# Patient Record
Sex: FEMALE | Race: WHITE | NOT HISPANIC OR LATINO | Employment: OTHER | URBAN - METROPOLITAN AREA
[De-identification: names, ages, dates, MRNs, and addresses within clinical notes are randomized per-mention and may not be internally consistent; named-entity substitution may affect disease eponyms.]

---

## 2024-11-01 ENCOUNTER — LAB VISIT (OUTPATIENT)
Dept: LAB | Facility: HOSPITAL | Age: 59
End: 2024-11-01
Attending: STUDENT IN AN ORGANIZED HEALTH CARE EDUCATION/TRAINING PROGRAM
Payer: COMMERCIAL

## 2024-11-01 ENCOUNTER — OFFICE VISIT (OUTPATIENT)
Dept: PRIMARY CARE CLINIC | Facility: CLINIC | Age: 59
End: 2024-11-01
Payer: COMMERCIAL

## 2024-11-01 VITALS
WEIGHT: 192.69 LBS | HEART RATE: 75 BPM | OXYGEN SATURATION: 98 % | SYSTOLIC BLOOD PRESSURE: 112 MMHG | DIASTOLIC BLOOD PRESSURE: 74 MMHG | BODY MASS INDEX: 26.1 KG/M2 | HEIGHT: 72 IN

## 2024-11-01 DIAGNOSIS — Z12.31 ENCOUNTER FOR SCREENING MAMMOGRAM FOR MALIGNANT NEOPLASM OF BREAST: ICD-10-CM

## 2024-11-01 DIAGNOSIS — M05.79 RHEUMATOID ARTHRITIS INVOLVING MULTIPLE SITES WITH POSITIVE RHEUMATOID FACTOR: ICD-10-CM

## 2024-11-01 DIAGNOSIS — Z96.653 HISTORY OF BILATERAL KNEE ARTHROPLASTY: ICD-10-CM

## 2024-11-01 DIAGNOSIS — Z00.00 HEALTH CARE MAINTENANCE: ICD-10-CM

## 2024-11-01 DIAGNOSIS — M50.90 CERVICAL DISC DISEASE: ICD-10-CM

## 2024-11-01 DIAGNOSIS — M05.79 RHEUMATOID ARTHRITIS INVOLVING MULTIPLE SITES WITH POSITIVE RHEUMATOID FACTOR: Primary | ICD-10-CM

## 2024-11-01 DIAGNOSIS — Z79.899 HIGH RISK MEDICATION USE: ICD-10-CM

## 2024-11-01 PROBLEM — R59.1 LAD (LYMPHADENOPATHY): Status: ACTIVE | Noted: 2024-11-01

## 2024-11-01 PROCEDURE — 84443 ASSAY THYROID STIM HORMONE: CPT | Performed by: STUDENT IN AN ORGANIZED HEALTH CARE EDUCATION/TRAINING PROGRAM

## 2024-11-01 PROCEDURE — 87389 HIV-1 AG W/HIV-1&-2 AB AG IA: CPT | Performed by: STUDENT IN AN ORGANIZED HEALTH CARE EDUCATION/TRAINING PROGRAM

## 2024-11-01 PROCEDURE — 85025 COMPLETE CBC W/AUTO DIFF WBC: CPT | Performed by: STUDENT IN AN ORGANIZED HEALTH CARE EDUCATION/TRAINING PROGRAM

## 2024-11-01 PROCEDURE — 80048 BASIC METABOLIC PNL TOTAL CA: CPT | Performed by: STUDENT IN AN ORGANIZED HEALTH CARE EDUCATION/TRAINING PROGRAM

## 2024-11-01 PROCEDURE — 83036 HEMOGLOBIN GLYCOSYLATED A1C: CPT | Performed by: STUDENT IN AN ORGANIZED HEALTH CARE EDUCATION/TRAINING PROGRAM

## 2024-11-01 PROCEDURE — 82607 VITAMIN B-12: CPT | Performed by: STUDENT IN AN ORGANIZED HEALTH CARE EDUCATION/TRAINING PROGRAM

## 2024-11-01 PROCEDURE — 80061 LIPID PANEL: CPT | Performed by: STUDENT IN AN ORGANIZED HEALTH CARE EDUCATION/TRAINING PROGRAM

## 2024-11-01 PROCEDURE — 99999 PR PBB SHADOW E&M-EST. PATIENT-LVL V: CPT | Mod: PBBFAC,,, | Performed by: STUDENT IN AN ORGANIZED HEALTH CARE EDUCATION/TRAINING PROGRAM

## 2024-11-01 RX ORDER — METHYLPREDNISOLONE 4 MG/1
TABLET ORAL
Qty: 1 EACH | Refills: 0 | Status: SHIPPED | OUTPATIENT
Start: 2024-11-01

## 2024-11-01 NOTE — PROGRESS NOTES
[facility]  Physical Therapy Initial Evaluation    Date: 11/4/2024   Name: Kaylah Tripathi [facility]  Clinic Number: [id number]    Therapy Diagnosis:   Encounter Diagnoses   Name Primary?    Cervical spondylosis     Cervical disc disease Yes     Physician: Valerio Shahid, *    Physician Orders: PT Eval and Treat   Medical Diagnosis from Referral: M50.90 (ICD-10-CM) - Cervical disc disease  Evaluation Date: 11/4/2024  Authorization Period Expiration: 11/1/2025  Plan of Care Expiration: 1/4/2025  Visit # / Visits authorized: 1/ 1 (pending)   Progress Note Due: 12/4/2025  FOTO: 1/ 1     Precautions: Standard and RA, left hearing loss    Time In: 2:35 PM  Time Out: 3:32 PM  Total Appointment Time (timed & untimed codes): 56 minutes    Subjective   Date of onset: ~ 2 months ago  History of current condition - Kaylah reports: that she had a double knee replacement of this year. Shortly following this she had a significant onset of neck pain that came around the same time that she had a lot of stress from difficulty with her multiple [employer]. Patient says she was diagnosed with RA 3 years ago. Shortly after neck pain onset she returned to see her orthopedist, Dr. Johnson. He told her not to use salon head washes, ride roller coasters or see chiropractors. She proceeded to see her PCP who took some x-rays and prescribed her physical therapy. She was on Emberil and recently went off, but is planning to return to it with doctors guidance for her RA. She was also placed on Prednisone which has recently helped alleviate her symptoms. Aggravating factors include rotating her head bilateral, turning head when driving, looking up, sitting/standing upright for long periods and performing work activities.     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     Facet hypertrophic degenerative changes are seen at the lower cervical spine bilaterally.     No fracture. No subluxation.        Medical History:   Past Medical History:   Diagnosis Date    Hearing loss of left ear 7/3/2024    Neuropathy     Rheumatoid arthritis      Surgical History:   Kaylah Shi  has a past surgical history that includes Induced  and Arthroplasty of both knees (Bilateral, 2024).    Medications:   Kyalah has a current medication list which includes the following prescription(s): ascorbic acid (vitamin c), aspirin, calcium-vitamin d3, ergocalciferol, fluticasone propionate, folic acid, hydroxychloroquine, ibuprofen, methylprednisolone, multivitamin, and UNABLE TO FIND.    Allergies:   Review of patient's allergies indicates:  No Known Allergies       Objective   Observation: Decreased left weight shift, protracted scapulae (right > left), decreased lumbar lordosis, left shoulder depression    Posture Alignment: slouched posture;forward head; trunk deviated right    Sensation: Light touch: Intact    DTR:WFL    Range of Motion - MOVEMENT LOSS "    ROM Loss   Flexion moderate loss   Extension moderate loss   Side bending Right major loss   Side bending Left major loss   Rotation Right major loss   Rotation Left major loss   Protraction within functional limits   Retraction  major loss       REPEATED TEST MOVEMENTS:   Repeated Protraction in Sitting worse   Repeated Flexion in Sitting no effect   Repeated Retraction in Sitting  no effect   Repeated Retraction Extension in Sitting no effect   Repeated Protraction in lying no effect   Repeated Flexion in lying no effect   Repeated Retraction in lying end range pain   Repeated Retraction Extension in lying abolished     Shoulder Range of Motion:   Shoulder Left Right   Flexion 180 180   Abduction 180 180   ER 45 43   IR 45 45       Upper Extremity Strength  (R) UE  (L) UE    Shoulder elevation: 4/5 Shoulder elevation: 4/5   Shoulder flexion: 4-/5 Shoulder flexion: 4-/5   Shoulder Abduction: 4-/5 Shoulder abduction: 4-/5   Shoulder ER 4-/5 Shoulder ER 4-/5   Shoulder IR 3+/5 Shoulder IR 3+/5   Elbow flexion: 4/5 Elbow flexion: 4/5   Elbow extension: 4/5 Elbow extension: 4/5   Wrist flexion: 4/5 Wrist flexion: 4/5   Wrist extension: 4/5 Wrist extension: 4/5    4+/5 : 4/5       Special Tests:  Distraction -   Compression -   Spurlings -   Sharp-Chema -   Lateral Flexion Alar Ligament -   DNF test -     Upper Limb Neurodynamic testing:   Right Left   UNT - -   MNT - -   RNT - -       Joint Mobility:   PA's hypomobile C6-T4,    Transverse glides T3-T6      Thoracic mobility: Decreased bilateral thoracic rotation and extension at T2-T6    Palpation: TTDP suboccipitals      PT Evaluation Completed? Yes  Discussed Plan of Care with patient: Yes    CMS Impairment/Limitation/Restriction for FOTO Cervical Survey    Therapist reviewed FOTO scores for Kaylah Shi on 11/4/2024.   FOTO documents entered into "[employer] - see Media section.    Limitation Score: 63%  Goal Score: 72%         TREATMENT  "    Total Treatment time separate from Evaluation: 23 minutes    Kaylah received therapeutic exercises to develop strength, endurance, ROM, flexibility, posture, and core stabilization for 0 minutes including:    Kaylah received the following manual therapy techniques: Joint mobilizations, Manual traction, Myofacial release, Manual Lymphatic Drainage, Soft tissue Mobilization, and Friction Massage were applied to the: - for 0 minutes, including:    Kaylah participated in neuromuscular re-education activities to improve: Balance, Coordination, Kinesthetic, Sense, Proprioception, and Posture for 23 minutes. The following activities were included:    Seated chin tucks x 5" x 3 x 10  Open books 3 x 10  Anterior scalene stretch x 1' x 3  RTB pull aparts w/chin tuck 3 x 10    Workstation ergonomics printout    Kaylah participated in dynamic functional therapeutic activities to improve functional performance for 0  minutes, including:    Kaylah participated in gait training to improve functional mobility and safety for 0 minutes, including:    Kaylah received  hot or cold pack for 0 minutes to cervical spine.      Home Exercises and Patient Education Provided    Education provided:   - HEP/Prognosis/POC/Relevant anatomy    Written Home Exercises Provided: Patient instructed to cont prior HEP.  Exercises were reviewed and Kaylah was able to demonstrate them prior to the end of the session.  Kaylah demonstrated good  understanding of the education provided.     See EMR under Patient Instructions for exercises provided 11/4/2024.    Assessment   Kaylah is a 59 y.o. female referred to outpatient Physical Therapy with a medical diagnosis of M50.90 (ICD-10-CM) - Cervical disc disease. Pt presents with bilateral neck pain that began 2 months ago when she had a significant increase in stressful work circumstances. Patient reports suffering from rheumatoid arthritis that is managed through diet modification and medical management. " Upon onset of neck pain she proceeded to see a doctor who provided her with a course of Prednisone which has offered substantial symptom relief and referred to physical therapy due to x-ray revealing cervical osteoarthritic changes. Physical examination reveals grossly limited cervical AROM except with protraction; decreased bilateral shoulder internal rotation MMT strength; neck pain provocation with protraction in sitting and repeated retraction in lying at end range; pain reduction with repeated cervical retraction with extension in lying; hypomobile posterior to anterior joint mobility at C6-T4, decreased transverse glides T3-T6, impaired thoracic extension; and tenderness to palpation of bilateral suboccipitals. Signs and symptoms are most consistent with cervical osteoarthritis. Patient's impairments affect her ability to perform ADLs that involve cervical extension or bilateral rotation; turning her head when driving; and maintaining upright postures for long durations throughout her days. Given the severity of the patient's cervical spine, her past medical history, and overall health for her age, a full recovery is expected within 6-8 weeks. Her rehab potential is dependent on compliance with PT recommendations and adherence to her home exercise program. Skilled PT intervention is required to address these key impairments and to provide and progress with an appropriate home exercise program. This evaluation is of low complexity due to the stable nature of the patients presentation as well as the comorbidities and medical factors included in this evaluation.The patient has been educated in the evaluation findings, prognosis, and plan of care, HEP and is in agreement and willing to participate in therapy.    Pt prognosis is Excellent.   Pt will benefit from skilled outpatient Physical Therapy to address the deficits stated above and in the chart below, provide pt/family education, and to maximize pt's level  of independence.     Plan of care discussed with patient: Yes  Pt's spiritual, cultural and educational needs considered and patient is agreeable to the plan of care and goals as stated below:     Anticipated Barriers for therapy: RA co-mobidity    Medical Necessity is demonstrated by the following  History  Co-morbidities and personal factors that may impact the plan of care Co-morbidities:    Hearing loss of left ear    Neuropath    Rheumatoid arthritis       Personal Factors:   no deficits     low   Examination  Body Structures and Functions, activity limitations and participation restrictions that may impact the plan of care Body Regions:   neck    Body Systems:    gross symmetry  ROM  strength  transitions    Participation Restrictions:   none    Activity limitations:   Learning and applying knowledge  no deficits    General Tasks and Commands  no deficits    Communication  no deficits    Mobility  driving (bike, car, motorcycle)    Self care  no deficits    Domestic Life  no deficits    Interactions/Relationships  no deficits    Life Areas  no deficits    Community and Social Life  no deficits         Complexity: low   Clinical Presentation stable and uncomplicated low   Decision Making/ Complexity Score: low       GOALS: Short Term Goals: 4 weeks  1.Report decreased in pain at worse less than  <   / =  4  /10  to increase tolerance for functional activities. On going  2. Pt to improve range of motion cervical by 25% to allow for improved functional mobility to allow for improvement in IADLs.  On going  3. Increased bilateral shoulder internal rotation MMT 1/2  grade to increase tolerance for ADL and work activities. On going  4. Pt will be able to turn her head while driving with no greater than 2/10 neck pain. On going  5. Pt to tolerate HEP to improve ROM and independence with ADL's. On going    Long Term Goals: 8 weeks  1.Report decreased in pain at worse less than  <   / =  1  /10  to increase tolerance  for functional activities. On going  2.Pt to improve range of motion by 75% to allow for improved functional mobility to allow for improvement in IADLs. On going  3.Increased bilateral shoulder internal rotation MMT  1 grade  to increase tolerance for ADL and work activities.On going  4.  Pt will report at least a 72% on FOTO neck survey score for neck pain disability to demonstrate decrease in disability and improvement in neck pain.On going  5. Pt to be Independent with HEP to improve ROM and independence with ADL's. On going  6. Pt to demonstrate ability to independently control and reduce their pain through posture positioning and mechanical movements throughout a typical day.  (approp and ongoing)  7. Pt will achieve adequate cervicothoracic stability in order to tolerate working at a desk for at least 1 hour with no greater than 1/10 neck pain in order to return to work activities.      Plan   Plan of care Certification: 11/4/2024 to 1/4/2025.    Outpatient Physical Therapy 1-2 times weekly for 6-8 weeks to include the following interventions: Cervical/Lumbar Traction, Electrical Stimulation  , Gait Training, Manual Therapy, Moist Heat/ Ice, Neuromuscular Re-ed, Patient Education, Self Care, Therapeutic Activities, Therapeutic Exercise, and Ultrasound. Guadalupe Pavon, PT      I CERTIFY THE NEED FOR THESE SERVICES FURNISHED UNDER THIS PLAN OF TREATMENT AND WHILE UNDER MY CARE   Physician's comments:     Physician's Signature: ___________________________________________________

## 2024-11-02 LAB
ANION GAP SERPL CALC-SCNC: 10 MMOL/L (ref 8–16)
BASOPHILS # BLD AUTO: 0.05 K/UL (ref 0–0.2)
BASOPHILS NFR BLD: 0.8 % (ref 0–1.9)
BUN SERPL-MCNC: 8 MG/DL (ref 6–20)
CALCIUM SERPL-MCNC: 9.5 MG/DL (ref 8.7–10.5)
CHLORIDE SERPL-SCNC: 101 MMOL/L (ref 95–110)
CHOLEST SERPL-MCNC: 153 MG/DL (ref 120–199)
CHOLEST/HDLC SERPL: 3.1 {RATIO} (ref 2–5)
CO2 SERPL-SCNC: 25 MMOL/L (ref 23–29)
CREAT SERPL-MCNC: 0.7 MG/DL (ref 0.5–1.4)
DIFFERENTIAL METHOD BLD: ABNORMAL
EOSINOPHIL # BLD AUTO: 0.2 K/UL (ref 0–0.5)
EOSINOPHIL NFR BLD: 2.5 % (ref 0–8)
ERYTHROCYTE [DISTWIDTH] IN BLOOD BY AUTOMATED COUNT: 12.8 % (ref 11.5–14.5)
EST. GFR  (NO RACE VARIABLE): >60 ML/MIN/1.73 M^2
ESTIMATED AVG GLUCOSE: 103 MG/DL (ref 68–131)
GLUCOSE SERPL-MCNC: 73 MG/DL (ref 70–110)
HBA1C MFR BLD: 5.2 % (ref 4–5.6)
HCT VFR BLD AUTO: 37.8 % (ref 37–48.5)
HDLC SERPL-MCNC: 50 MG/DL (ref 40–75)
HDLC SERPL: 32.7 % (ref 20–50)
HGB BLD-MCNC: 11.5 G/DL (ref 12–16)
HIV 1+2 AB+HIV1 P24 AG SERPL QL IA: NORMAL
IMM GRANULOCYTES # BLD AUTO: 0.02 K/UL (ref 0–0.04)
IMM GRANULOCYTES NFR BLD AUTO: 0.3 % (ref 0–0.5)
LDLC SERPL CALC-MCNC: 87.2 MG/DL (ref 63–159)
LYMPHOCYTES # BLD AUTO: 1.4 K/UL (ref 1–4.8)
LYMPHOCYTES NFR BLD: 23.6 % (ref 18–48)
MCH RBC QN AUTO: 28.2 PG (ref 27–31)
MCHC RBC AUTO-ENTMCNC: 30.4 G/DL (ref 32–36)
MCV RBC AUTO: 93 FL (ref 82–98)
MONOCYTES # BLD AUTO: 0.7 K/UL (ref 0.3–1)
MONOCYTES NFR BLD: 11 % (ref 4–15)
NEUTROPHILS # BLD AUTO: 3.6 K/UL (ref 1.8–7.7)
NEUTROPHILS NFR BLD: 61.8 % (ref 38–73)
NONHDLC SERPL-MCNC: 103 MG/DL
NRBC BLD-RTO: 0 /100 WBC
PLATELET # BLD AUTO: 440 K/UL (ref 150–450)
PMV BLD AUTO: 8.7 FL (ref 9.2–12.9)
POTASSIUM SERPL-SCNC: 3.8 MMOL/L (ref 3.5–5.1)
RBC # BLD AUTO: 4.08 M/UL (ref 4–5.4)
SODIUM SERPL-SCNC: 136 MMOL/L (ref 136–145)
TRIGL SERPL-MCNC: 79 MG/DL (ref 30–150)
TSH SERPL DL<=0.005 MIU/L-ACNC: 2.29 UIU/ML (ref 0.4–4)
VIT B12 SERPL-MCNC: 779 PG/ML (ref 210–950)
WBC # BLD AUTO: 5.89 K/UL (ref 3.9–12.7)

## 2024-11-04 ENCOUNTER — CLINICAL SUPPORT (OUTPATIENT)
Dept: REHABILITATION | Facility: OTHER | Age: 59
End: 2024-11-04
Payer: COMMERCIAL

## 2024-11-04 DIAGNOSIS — M50.90 CERVICAL DISC DISEASE: Primary | ICD-10-CM

## 2024-11-04 DIAGNOSIS — M47.812 CERVICAL SPONDYLOSIS: ICD-10-CM

## 2024-11-04 PROCEDURE — 97112 NEUROMUSCULAR REEDUCATION: CPT

## 2024-11-04 PROCEDURE — 97161 PT EVAL LOW COMPLEX 20 MIN: CPT

## 2024-11-04 NOTE — PLAN OF CARE
[facility]  Physical Therapy Initial Evaluation    Date: 11/4/2024   Name: Kaylah Tripathi [facility]  Clinic Number: [id number]    Therapy Diagnosis:   Encounter Diagnoses   Name Primary?    Cervical spondylosis     Cervical disc disease Yes     Physician: Valerio Shahid, *    Physician Orders: PT Eval and Treat   Medical Diagnosis from Referral: M50.90 (ICD-10-CM) - Cervical disc disease  Evaluation Date: 11/4/2024  Authorization Period Expiration: 11/1/2025  Plan of Care Expiration: 1/4/2025  Visit # / Visits authorized: 1/ 1 (pending)   Progress Note Due: 12/4/2025  FOTO: 1/ 1     Precautions: Standard and RA, left hearing loss    Time In: 2:35 PM  Time Out: 3:32 PM  Total Appointment Time (timed & untimed codes): 56 minutes    Subjective   Date of onset: ~ 2 months ago  History of current condition - Kaylah reports: that she had a double knee replacement of this year. Shortly following this she had a significant onset of neck pain that came around the same time that she had a lot of stress from difficulty with her multiple [employer]. Patient says she was diagnosed with RA 3 years ago. Shortly after neck pain onset she returned to see her orthopedist, Dr. Johnson. He told her not to use salon head washes, ride roller coasters or see chiropractors. She proceeded to see her PCP who took some x-rays and prescribed her physical therapy. She was on Emberil and recently went off, but is planning to return to it with doctors guidance for her RA. She was also placed on Prednisone which has recently helped alleviate her symptoms. Aggravating factors include rotating her head bilateral, turning head when driving, looking up, sitting/standing upright for long periods and performing work activities.     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     Facet hypertrophic degenerative changes are seen at the lower cervical spine bilaterally.     No fracture. No subluxation.        Medical History:   Past Medical History:   Diagnosis Date    Hearing loss of left ear 7/3/2024    Neuropathy     Rheumatoid arthritis      Surgical History:   Kaylah Shi  has a past surgical history that includes Induced  and Arthroplasty of both knees (Bilateral, 2024).    Medications:   Kaylah has a current medication list which includes the following prescription(s): ascorbic acid (vitamin c), aspirin, calcium-vitamin d3, ergocalciferol, fluticasone propionate, folic acid, hydroxychloroquine, ibuprofen, methylprednisolone, multivitamin, and UNABLE TO FIND.    Allergies:   Review of patient's allergies indicates:  No Known Allergies       Objective   Observation: Decreased left weight shift, protracted scapulae (right > left), decreased lumbar lordosis, left shoulder depression    Posture Alignment: slouched posture;forward head; trunk deviated right    Sensation: Light touch: Intact    DTR:WFL    Range of Motion - MOVEMENT LOSS "    ROM Loss   Flexion moderate loss   Extension moderate loss   Side bending Right major loss   Side bending Left major loss   Rotation Right major loss   Rotation Left major loss   Protraction within functional limits   Retraction  major loss       REPEATED TEST MOVEMENTS:   Repeated Protraction in Sitting worse   Repeated Flexion in Sitting no effect   Repeated Retraction in Sitting  no effect   Repeated Retraction Extension in Sitting no effect   Repeated Protraction in lying no effect   Repeated Flexion in lying no effect   Repeated Retraction in lying end range pain   Repeated Retraction Extension in lying abolished     Shoulder Range of Motion:   Shoulder Left Right   Flexion 180 180   Abduction 180 180   ER 45 43   IR 45 45       Upper Extremity Strength  (R) UE  (L) UE    Shoulder elevation: 4/5 Shoulder elevation: 4/5   Shoulder flexion: 4-/5 Shoulder flexion: 4-/5   Shoulder Abduction: 4-/5 Shoulder abduction: 4-/5   Shoulder ER 4-/5 Shoulder ER 4-/5   Shoulder IR 3+/5 Shoulder IR 3+/5   Elbow flexion: 4/5 Elbow flexion: 4/5   Elbow extension: 4/5 Elbow extension: 4/5   Wrist flexion: 4/5 Wrist flexion: 4/5   Wrist extension: 4/5 Wrist extension: 4/5    4+/5 : 4/5       Special Tests:  Distraction -   Compression -   Spurlings -   Sharp-Chema -   Lateral Flexion Alar Ligament -   DNF test -     Upper Limb Neurodynamic testing:   Right Left   UNT - -   MNT - -   RNT - -       Joint Mobility:   PA's hypomobile C6-T4,    Transverse glides T3-T6      Thoracic mobility: Decreased bilateral thoracic rotation and extension at T2-T6    Palpation: TTDP suboccipitals      PT Evaluation Completed? Yes  Discussed Plan of Care with patient: Yes    CMS Impairment/Limitation/Restriction for FOTO Cervical Survey    Therapist reviewed FOTO scores for Kaylah Shi on 11/4/2024.   FOTO documents entered into [employer] - see Media section.    Limitation Score: 63%  Goal Score: 72%         TREATMENT  "    Total Treatment time separate from Evaluation: 23 minutes    Kaylah received therapeutic exercises to develop strength, endurance, ROM, flexibility, posture, and core stabilization for 0 minutes including:    Kaylah received the following manual therapy techniques: Joint mobilizations, Manual traction, Myofacial release, Manual Lymphatic Drainage, Soft tissue Mobilization, and Friction Massage were applied to the: - for 0 minutes, including:    Kaylah participated in neuromuscular re-education activities to improve: Balance, Coordination, Kinesthetic, Sense, Proprioception, and Posture for 23 minutes. The following activities were included:    Seated chin tucks x 5" x 3 x 10  Open books 3 x 10  Anterior scalene stretch x 1' x 3  RTB pull aparts w/chin tuck 3 x 10    Workstation ergonomics printout    Kaylah participated in dynamic functional therapeutic activities to improve functional performance for 0  minutes, including:    Kaylah participated in gait training to improve functional mobility and safety for 0 minutes, including:    Kaylah received  hot or cold pack for 0 minutes to cervical spine.      Home Exercises and Patient Education Provided    Education provided:   - HEP/Prognosis/POC/Relevant anatomy    Written Home Exercises Provided: Patient instructed to cont prior HEP.  Exercises were reviewed and Kaylah was able to demonstrate them prior to the end of the session.  Kaylah demonstrated good  understanding of the education provided.     See EMR under Patient Instructions for exercises provided 11/4/2024.    Assessment   Kaylah is a 59 y.o. female referred to outpatient Physical Therapy with a medical diagnosis of M50.90 (ICD-10-CM) - Cervical disc disease. Pt presents with bilateral neck pain that began 2 months ago when she had a significant increase in stressful work circumstances. Patient reports suffering from rheumatoid arthritis that is managed through diet modification and medical management. " Upon onset of neck pain she proceeded to see a doctor who provided her with a course of Prednisone which has offered substantial symptom relief and referred to physical therapy due to x-ray revealing cervical osteoarthritic changes. Physical examination reveals grossly limited cervical AROM except with protraction; decreased bilateral shoulder internal rotation MMT strength; neck pain provocation with protraction in sitting and repeated retraction in lying at end range; pain reduction with repeated cervical retraction with extension in lying; hypomobile posterior to anterior joint mobility at C6-T4, decreased transverse glides T3-T6, impaired thoracic extension; and tenderness to palpation of bilateral suboccipitals. Signs and symptoms are most consistent with cervical osteoarthritis. Patient's impairments affect her ability to perform ADLs that involve cervical extension or bilateral rotation; turning her head when driving; and maintaining upright postures for long durations throughout her days. Given the severity of the patient's cervical spine, her past medical history, and overall health for her age, a full recovery is expected within 6-8 weeks. Her rehab potential is dependent on compliance with PT recommendations and adherence to her home exercise program. Skilled PT intervention is required to address these key impairments and to provide and progress with an appropriate home exercise program. This evaluation is of low complexity due to the stable nature of the patients presentation as well as the comorbidities and medical factors included in this evaluation.The patient has been educated in the evaluation findings, prognosis, and plan of care, HEP and is in agreement and willing to participate in therapy.    Pt prognosis is Excellent.   Pt will benefit from skilled outpatient Physical Therapy to address the deficits stated above and in the chart below, provide pt/family education, and to maximize pt's level  of independence.     Plan of care discussed with patient: Yes  Pt's spiritual, cultural and educational needs considered and patient is agreeable to the plan of care and goals as stated below:     Anticipated Barriers for therapy: RA co-mobidity    Medical Necessity is demonstrated by the following  History  Co-morbidities and personal factors that may impact the plan of care Co-morbidities:    Hearing loss of left ear    Neuropath    Rheumatoid arthritis       Personal Factors:   no deficits     low   Examination  Body Structures and Functions, activity limitations and participation restrictions that may impact the plan of care Body Regions:   neck    Body Systems:    gross symmetry  ROM  strength  transitions    Participation Restrictions:   none    Activity limitations:   Learning and applying knowledge  no deficits    General Tasks and Commands  no deficits    Communication  no deficits    Mobility  driving (bike, car, motorcycle)    Self care  no deficits    Domestic Life  no deficits    Interactions/Relationships  no deficits    Life Areas  no deficits    Community and Social Life  no deficits         Complexity: low   Clinical Presentation stable and uncomplicated low   Decision Making/ Complexity Score: low       GOALS: Short Term Goals: 4 weeks  1.Report decreased in pain at worse less than  <   / =  4  /10  to increase tolerance for functional activities. On going  2. Pt to improve range of motion cervical by 25% to allow for improved functional mobility to allow for improvement in IADLs.  On going  3. Increased bilateral shoulder internal rotation MMT 1/2  grade to increase tolerance for ADL and work activities. On going  4. Pt will be able to turn her head while driving with no greater than 2/10 neck pain. On going  5. Pt to tolerate HEP to improve ROM and independence with ADL's. On going    Long Term Goals: 8 weeks  1.Report decreased in pain at worse less than  <   / =  1  /10  to increase tolerance  for functional activities. On going  2.Pt to improve range of motion by 75% to allow for improved functional mobility to allow for improvement in IADLs. On going  3.Increased bilateral shoulder internal rotation MMT  1 grade  to increase tolerance for ADL and work activities.On going  4.  Pt will report at least a 72% on FOTO neck survey score for neck pain disability to demonstrate decrease in disability and improvement in neck pain.On going  5. Pt to be Independent with HEP to improve ROM and independence with ADL's. On going  6. Pt to demonstrate ability to independently control and reduce their pain through posture positioning and mechanical movements throughout a typical day.  (approp and ongoing)  7. Pt will achieve adequate cervicothoracic stability in order to tolerate working at a desk for at least 1 hour with no greater than 1/10 neck pain in order to return to work activities.      Plan   Plan of care Certification: 11/4/2024 to 1/4/2025.    Outpatient Physical Therapy 1-2 times weekly for 6-8 weeks to include the following interventions: Cervical/Lumbar Traction, Electrical Stimulation , Gait Training, Manual Therapy, Moist Heat/ Ice, Neuromuscular Re-ed, Patient Education, Self Care, Therapeutic Activities, Therapeutic Exercise, and Ultrasound. Guadalupe Pavon, PT      I CERTIFY THE NEED FOR THESE SERVICES FURNISHED UNDER THIS PLAN OF TREATMENT AND WHILE UNDER MY CARE   Physician's comments:     Physician's Signature: ___________________________________________________

## 2024-11-05 ENCOUNTER — OFFICE VISIT (OUTPATIENT)
Dept: SPINE | Facility: CLINIC | Age: 59
End: 2024-11-05
Payer: COMMERCIAL

## 2024-11-05 VITALS
SYSTOLIC BLOOD PRESSURE: 122 MMHG | RESPIRATION RATE: 18 BRPM | DIASTOLIC BLOOD PRESSURE: 78 MMHG | HEART RATE: 62 BPM | HEIGHT: 72 IN | BODY MASS INDEX: 7.81 KG/M2 | WEIGHT: 57.63 LBS

## 2024-11-05 DIAGNOSIS — M79.18 MYOFASCIAL PAIN: ICD-10-CM

## 2024-11-05 DIAGNOSIS — M54.2 CERVICALGIA: Primary | ICD-10-CM

## 2024-11-05 DIAGNOSIS — M50.90 CERVICAL DISC DISEASE: ICD-10-CM

## 2024-11-05 PROCEDURE — 3078F DIAST BP <80 MM HG: CPT | Mod: CPTII,S$GLB,, | Performed by: NURSE PRACTITIONER

## 2024-11-05 PROCEDURE — 1160F RVW MEDS BY RX/DR IN RCRD: CPT | Mod: CPTII,S$GLB,, | Performed by: NURSE PRACTITIONER

## 2024-11-05 PROCEDURE — 1159F MED LIST DOCD IN RCRD: CPT | Mod: CPTII,S$GLB,, | Performed by: NURSE PRACTITIONER

## 2024-11-05 PROCEDURE — 3044F HG A1C LEVEL LT 7.0%: CPT | Mod: CPTII,S$GLB,, | Performed by: NURSE PRACTITIONER

## 2024-11-05 PROCEDURE — 3008F BODY MASS INDEX DOCD: CPT | Mod: CPTII,S$GLB,, | Performed by: NURSE PRACTITIONER

## 2024-11-05 PROCEDURE — 3074F SYST BP LT 130 MM HG: CPT | Mod: CPTII,S$GLB,, | Performed by: NURSE PRACTITIONER

## 2024-11-05 PROCEDURE — 99999 PR PBB SHADOW E&M-EST. PATIENT-LVL IV: CPT | Mod: PBBFAC,,, | Performed by: NURSE PRACTITIONER

## 2024-11-05 PROCEDURE — 99204 OFFICE O/P NEW MOD 45 MIN: CPT | Mod: S$GLB,,, | Performed by: NURSE PRACTITIONER

## 2024-11-05 NOTE — PROGRESS NOTES
Subjective:     Patient ID: Kaylah Shi is a 59 y.o. female.    Chief Complaint: No chief complaint on file.    HPI Ms. Shi is a 59-year-old female here for evaluation of neck pain    Complaints of neck pain started about 7 weeks ago  Denies any radicular symptoms  Reports decreased range of motion that has since improved some  She started PT yesterday and all ready starting to feel some relief    Cervical xray     FINDINGS/IMPRESSION:     Mild degenerative changes of the mid to lower cervical spine including disc height narrowing at C5-6 and C6-7 with moderate spurring at these levels.     Facet hypertrophic degenerative changes are seen at the lower cervical spine bilaterally.     No fracture. No subluxation.     Past Medical History:   Diagnosis Date    Hearing loss of left ear 7/3/2024    Neuropathy     Rheumatoid arthritis        Past Surgical History:   Procedure Laterality Date    ARTHROPLASTY OF BOTH KNEES Bilateral 2024    Procedure: ARTHROPLASTY, KNEE, TOTAL;  Surgeon: Carlos Johnson MD;  [facility]: [facility];  Service: Orthopedics;  Laterality: Bilateral;    INDUCED          Family History   Problem Relation Name Age of Onset    Neuropathy Father      Cancer Father      No Known Problems Brother      Aneurysm Neg Hx      Clotting disorder Neg Hx      Dementia Neg Hx      Diabetes Neg Hx      Fainting Neg Hx      Hyperlipidemia Neg Hx      Kidney disease Neg Hx      Liver disease Neg Hx      Migraines Neg Hx      Obesity Neg Hx      Parkinsonism Neg Hx      Seizures Neg Hx      Stroke Neg Hx      Tremor Neg Hx         Social History     Socioeconomic History    Marital status:    Occupational History    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   Tobacco Use    Smoking status: Never    Smokeless tobacco: Never   Substance and Sexual Activity    Alcohol use: Yes     Alcohol/week: 35.0 standard drinks of alcohol     Types: 35 Glasses of  wine per week     Comment: 4 drinks/day daily    Drug use: No   Social History Narrative    Owns and tends bar    ; no children    Regular exercise: yoga 3 days/week    Enjoys gardening     Social Drivers of Health     Financial Resource Strain: Low Risk  (9/25/2024)    Overall Financial Resource Strain (CARDIA)     Difficulty of Paying Living Expenses: Not hard at all   Food Insecurity: No Food Insecurity (9/25/2024)    Hunger Vital Sign     Worried About Running Out of Food in the Last Year: Never true     Ran Out of Food in the Last Year: Never true   Physical Activity: Sufficiently Active (9/25/2024)    Exercise Vital Sign     Days of Exercise per Week: 7 days     Minutes of Exercise per Session: 30 min   Stress: Stress Concern Present (9/25/2024)    [employer] - Occupational Stress Questionnaire     Feeling of Stress : Very much   Housing Stability: Unknown (9/25/2024)    Housing Stability Vital Sign     Unable to Pay for Housing in the Last Year: No       Current Outpatient Medications   Medication Sig Dispense Refill    ascorbic acid, vitamin C, (VITAMIN C) 1000 MG tablet Take 1,000 mg by mouth once daily.      aspirin 81 MG Chew Take 1 tablet (81 mg total) by mouth 2 (two) times daily. 60 tablet 0    calcium-vitamin D3 (OS-SHILPA 500 + D3) 500 mg(1,250mg) -200 unit per tablet Take 1 tablet by mouth 2 (two) times daily with meals.      ergocalciferol (VITAMIN D2) 50,000 unit Cap Take by mouth Daily.      fluticasone propionate (FLONASE) 50 mcg/actuation nasal spray 1 spray (50 mcg total) by Each Nostril route once daily. 15.8 mL 3    folic acid (FOLVITE) 800 MCG Tab Take 1,000 mcg by mouth once daily.      hydroxychloroquine (PLAQUENIL) 200 mg tablet Take 2 tablets (400 mg total) by mouth once daily.      ibuprofen (ADVIL,MOTRIN) 800 MG tablet Take 1 tablet (800 mg total) by mouth 3 (three) times daily. 90 tablet 1    methylPREDNISolone (MEDROL DOSEPACK) 4 mg tablet use as  directed 1 each 0    multivitamin (THERAGRAN) per tablet Take 1 tablet by mouth once daily.      UNABLE TO FIND Biotin and keritin gummie       No current facility-administered medications for this visit.       Review of patient's allergies indicates:  No Known Allergies     Review of Systems   Constitutional: Negative for fever.   Cardiovascular:  Negative for chest pain.   Respiratory:  Negative for shortness of breath.    Musculoskeletal:  Positive for neck pain.   Gastrointestinal:  Negative for bowel incontinence.   Genitourinary:  Negative for bladder incontinence.   Neurological:  Negative for headaches, numbness and paresthesias.        Objective:     General: Kaylah is well-developed, well-nourished, appears stated age, in no acute distress, alert and oriented to time, place and person.     General    Vitals reviewed.  Constitutional: She is oriented to person, place, and time. She appears well-developed and well-nourished.   HENT:   Head: Atraumatic.   Nose: Nose normal.   Eyes: Conjunctivae are normal.   Cardiovascular:  Normal rate.            Pulmonary/Chest: Effort normal.   Neurological: She is alert and oriented to person, place, and time.   Psychiatric: She has a normal mood and affect. Her behavior is normal. Judgment and thought content normal.     General Musculoskeletal Exam   Gait: normal     Back (L-Spine & T-Spine) / Neck (C-Spine) Exam     Neck (C-Spine) Range of Motion   Flexion:      Limited  Extension:  Limited  Right Lateral Bend: abnormal  Left Lateral Bend: abnormal  Right Rotation: abnormal  Left Rotation: abnormal    Spinal Sensation   Right Side Sensation  C-Spine Level: normal   Left Side Sensation  C-Spine Level: normal    Other   She has no scoliosis .  Spinal Kyphosis:  Absent      Muscle Strength   Right Upper Extremity   Biceps: 5/5   Deltoid:  5/5  Triceps:  5/5  Finger Extensors:  5/5  Left Upper Extremity  Biceps: 5/5   Deltoid:  5/5  Triceps:  5/5  Finger Extensors:   5/5  Right Lower Extremity   Hip Flexion: 5/5   Quadriceps:  5/5   Anterior tibial:  5/5   EHL:  5/5  Left Lower Extremity   Hip Flexion: 5/5   Quadriceps:  5/5   Anterior tibial:  5/5   EHL:  5/5    Reflexes     Left Side  Biceps:  2+  Brachioradialis:  2+    Right Side   Biceps:  2+  Brachioradialis:  2+    Vascular Exam     Right Pulses        Carotid:                  2+    Left Pulses        Carotid:                  2+          Assessment:     1. Cervicalgia    2. Cervical disc disease    3. Myofascial pain         Plan:        Prior records reviewed  We discussed back pain and the nature of back pain.  We discussed that it is not one thing that causes the pain but an accumulation of multiple things that we do.    Continue physical therapy and home exercise  We discussed posture sitting and the importance of trying to sit better.    We discussed the benefits of therapy and exercise and continuing to move.   Follow-up as needed    More than 50% of the total time  of 45 minutes was spent face to face in counseling on diagnosis and treatment options. I also counseled patient  on common and most usual side effect of prescribed medications. Preparing to see the patient (eg, review of tests), Obtaining and/or reviewing separately obtained history, documenting clinical information in the electronic or other health record, independently interpreting results (not separately reported) and communicating results to the patient/family/caregiver, or care coordination (not separately reported). I reviewed Primary care , and other specialty's notes to better coordinate patient's care. All questions were answered, and patient voiced understanding.      Follow-up: No follow-ups on file. If there are any questions prior to this, the patient was instructed to contact the office.

## 2024-11-07 ENCOUNTER — PATIENT MESSAGE (OUTPATIENT)
Dept: RHEUMATOLOGY | Facility: CLINIC | Age: 59
End: 2024-11-07
Payer: COMMERCIAL

## 2024-11-07 ENCOUNTER — OFFICE VISIT (OUTPATIENT)
Dept: OTOLARYNGOLOGY | Facility: CLINIC | Age: 59
End: 2024-11-07
Payer: COMMERCIAL

## 2024-11-07 VITALS
HEART RATE: 74 BPM | HEIGHT: 72 IN | DIASTOLIC BLOOD PRESSURE: 68 MMHG | WEIGHT: 57.56 LBS | SYSTOLIC BLOOD PRESSURE: 146 MMHG | BODY MASS INDEX: 7.8 KG/M2

## 2024-11-07 DIAGNOSIS — H65.91 RIGHT OTITIS MEDIA WITH EFFUSION: ICD-10-CM

## 2024-11-07 DIAGNOSIS — M05.79 RHEUMATOID ARTHRITIS INVOLVING MULTIPLE SITES WITH POSITIVE RHEUMATOID FACTOR: Primary | ICD-10-CM

## 2024-11-07 DIAGNOSIS — H90.3 SENSORINEURAL HEARING LOSS (SNHL) OF BOTH EARS: Primary | ICD-10-CM

## 2024-11-07 PROCEDURE — 3078F DIAST BP <80 MM HG: CPT | Mod: CPTII,S$GLB,, | Performed by: STUDENT IN AN ORGANIZED HEALTH CARE EDUCATION/TRAINING PROGRAM

## 2024-11-07 PROCEDURE — 3077F SYST BP >= 140 MM HG: CPT | Mod: CPTII,S$GLB,, | Performed by: STUDENT IN AN ORGANIZED HEALTH CARE EDUCATION/TRAINING PROGRAM

## 2024-11-07 PROCEDURE — 3008F BODY MASS INDEX DOCD: CPT | Mod: CPTII,S$GLB,, | Performed by: STUDENT IN AN ORGANIZED HEALTH CARE EDUCATION/TRAINING PROGRAM

## 2024-11-07 PROCEDURE — 3044F HG A1C LEVEL LT 7.0%: CPT | Mod: CPTII,S$GLB,, | Performed by: STUDENT IN AN ORGANIZED HEALTH CARE EDUCATION/TRAINING PROGRAM

## 2024-11-07 PROCEDURE — 99204 OFFICE O/P NEW MOD 45 MIN: CPT | Mod: S$GLB,,, | Performed by: STUDENT IN AN ORGANIZED HEALTH CARE EDUCATION/TRAINING PROGRAM

## 2024-11-07 PROCEDURE — 1159F MED LIST DOCD IN RCRD: CPT | Mod: CPTII,S$GLB,, | Performed by: STUDENT IN AN ORGANIZED HEALTH CARE EDUCATION/TRAINING PROGRAM

## 2024-11-07 PROCEDURE — 1160F RVW MEDS BY RX/DR IN RCRD: CPT | Mod: CPTII,S$GLB,, | Performed by: STUDENT IN AN ORGANIZED HEALTH CARE EDUCATION/TRAINING PROGRAM

## 2024-11-07 NOTE — PROGRESS NOTES
Ear, Nose, & Throat  Otolaryngology - Head & Neck Surgery    Summary of Visit:  Kaylah Shi is a kind patient who was referred to me by Dr. Sindhu Mckeon in consultation for Ear Fullness  As I discussed with the her, her audiogram shows evidence of  bilateral moderate sensorineural hearing loss.  Her audiogram is not entirely consistent with noise exposure or presbycusis.  Her hearing loss is likely multifactorial with a possible genetic component  given her familiar history. We also discussed repeating her audiogram in 1 year. Thank you for this consult. Please feel free to reach out to me or my office with any concerns if needed.     Wolf Zamora MD  [address] [facility], [address]  [place] [address]  P:   F:   Subjective:     Chief Complaint:   Chief Complaint   Patient presents with    Ear Fullness       Kaylah Shi is a 59 y.o. female who was referred to me by Dr. Sindhu Mckeon in consultation for chronic, progressive hearing loss. She first noticed a change in his hearing 5 years ago.    She feels her hearing struggles the most when  speaking with her  and watching two-view    Associated symptoms include  none . She denies any non-pulsatile tinnitus, vertigo, aural fullness, otalgia, or otorrhea.     Otologic history is significant for none    She does not wear hearing aids but is interested.     She is not using ototoxic medications.     Risk factors for hearing loss include familial history of early hearing loss (father).      Past Medical History  Active Ambulatory Problems     Diagnosis Date Noted    Rheumatoid arthritis involving multiple sites with positive rheumatoid factor 03/17/2021    High risk medication use 03/25/2021    Hearing loss of left ear 07/03/2024    Ear fullness, left 07/03/2024    Tinnitus of both ears 07/03/2024    History of bilateral knee arthroplasty 02/08/2024    Cervical disc disease 11/01/2024    LAD (lymphadenopathy)  "2024     Resolved Ambulatory Problems     Diagnosis Date Noted    Neuropathy 2021    Left knee pain 2023    Leg weakness, bilateral 2023    Knee pain, bilateral 2023     Past Medical History:   Diagnosis Date    Rheumatoid arthritis        Past Surgical History  She has a past surgical history that includes Induced  and Arthroplasty of both knees (Bilateral, 2024).    Past Surgical History:   Procedure Laterality Date    ARTHROPLASTY OF BOTH KNEES Bilateral 2024    Procedure: ARTHROPLASTY, KNEE, TOTAL;  Surgeon: Carlos Johnson MD;  Location: [facility];  Service: Orthopedics;  Laterality: Bilateral;    INDUCED           Family History  Her family history includes Cancer in her father; Neuropathy in her father; No Known Problems in her brother.    Social History  She reports that she has never smoked. She has never used smokeless tobacco. She reports current alcohol use of about 35.0 standard drinks of alcohol per week. She reports that she does not use drugs.    Allergies  She has No Known Allergies.    Medications  She has a current medication list which includes the following prescription(s): ascorbic acid (vitamin c), calcium-vitamin d3, ergocalciferol, fluticasone propionate, folic acid, hydroxychloroquine, ibuprofen, methylprednisolone, multivitamin, and UNABLE TO FIND.    ROS:  Pertinent positive and negative review of systems as noted in HPI.      Objective:     BP (!) 146/68 (BP Location: Left arm, Patient Position: Sitting)   Pulse 74   Ht 6' 1" (1.854 m)   Wt 26.1 kg (57 lb 8.6 oz)   LMP  (LMP Unknown)   BMI 7.59 kg/m²    Constitutional: Well appearing, communicating. No acute distress  Voice: Euphonic  Eyes: Conjunctiva WNL, Pupils reactive  Head/Face: House Brackmann I Bilaterally.  Right Ear:    Auricle normally developed   EAC: normal   Tympanic membrane: intact   Middle Ear: No effusion present and Ossicles in normal position  Left Ear: "    Auricle normally developed   EAC: normal   Tympanic membrane: intact   Middle Ear: Serous effusion and Ossicles in normal position  Vestibular:    Spontaneous Nystagmus: none  Neuro/Psychiatric:     Affect: Appropriate   Eyes: EOMI intact  Respiratory: No increased WOB, no stridor       Data Review:   LABS    I have independently reviewed the lab results shown above. Findings significant for the conditions being treated include: none    IMAGING    No pertinent imaging available    AUDIO    I have independently reviewed the following audiogram and reviewed the report. Findings as follows:     Pure Tone Audiometry: Symmetric  Right - Normal (0-25 dB) to Moderate-severe (56-70 dB) sensorineural hearing loss  Left - Mild (26-40 dB) to Moderate-severe (56-70 dB) sensorineural hearing loss    Tympanometry  Right: Type A  Left: Type A    Word Discrimination Score  Right: 92%  Left 88%    Acoustic Reflexes  Right Stimulation - Right reflex: reflex absent   Left Reflex: DNT  Left Stimulation - Right reflex: DNT   Left Reflex: reflex absent          Procedures:       Assessment:     1. Sensorineural hearing loss (SNHL) of both ears    2. Right otitis media with effusion        Plan:     I had a long discussion with the patient regarding her condition. I believe that she is an appropriate candidate for hearing aids. I will give her the contact information of Tania Sr () our hearing aid coordinator. I also discussed with her the need to repeat her audiogram in 1 year. She was afforded an opportunity to ask questions. She showed good understanding and agreed with this plan.     Otitis media with effusion not causing conductive loss.  Monitor to see if the effusion resolves over time.    No orders of the defined types were placed in this encounter.         Problem List Items Addressed This Visit    None  Visit Diagnoses       Sensorineural hearing loss (SNHL) of both ears    -  Primary    Right otitis media with  effusion